# Patient Record
Sex: FEMALE
[De-identification: names, ages, dates, MRNs, and addresses within clinical notes are randomized per-mention and may not be internally consistent; named-entity substitution may affect disease eponyms.]

---

## 2022-05-26 ENCOUNTER — DASHBOARD ENCOUNTERS (OUTPATIENT)
Age: 32
End: 2022-05-26

## 2022-05-31 ENCOUNTER — OFFICE VISIT (OUTPATIENT)
Dept: URBAN - METROPOLITAN AREA CLINIC 40 | Facility: CLINIC | Age: 32
End: 2022-05-31

## 2022-05-31 RX ORDER — LISINOPRIL 40 MG/1
1 TABLET TABLET ORAL ONCE A DAY
Status: ACTIVE | COMMUNITY

## 2022-05-31 RX ORDER — DOXAZOSIN MESYLATE 1 MG/1
1 TABLET TABLET ORAL ONCE A DAY
Status: ACTIVE | COMMUNITY

## 2022-05-31 RX ORDER — DICYCLOMINE HYDROCHLORIDE 20 MG/1
1 TABLET TABLET ORAL THREE TIMES A DAY
Status: ACTIVE | COMMUNITY

## 2022-05-31 RX ORDER — PANTOPRAZOLE SODIUM 40 MG/1
1 TABLET TABLET, DELAYED RELEASE ORAL ONCE A DAY
Status: ACTIVE | COMMUNITY

## 2022-05-31 RX ORDER — CHLORTHALIDONE 25 MG/1
1 TABLET IN THE MORNING WITH FOOD TABLET ORAL ONCE A DAY
Status: ACTIVE | COMMUNITY

## 2022-05-31 RX ORDER — SUCRALFATE 1 G/1
1 TABLET ON AN EMPTY STOMACH TABLET ORAL TWICE A DAY
Status: ACTIVE | COMMUNITY

## 2022-05-31 RX ORDER — NIFEDIPINE 60 MG/1
1 TABLET ON AN EMPTY STOMACH TABLET, EXTENDED RELEASE ORAL ONCE A DAY
Status: ACTIVE | COMMUNITY

## 2022-05-31 RX ORDER — MINOXIDIL 2.5 MG/1
1 TABLET TABLET ORAL TWICE A DAY
Status: ACTIVE | COMMUNITY

## 2022-05-31 RX ORDER — CLONIDINE HYDROCHLORIDE 0.3 MG/1
1 TABLET TABLET ORAL ONCE A DAY
Status: ACTIVE | COMMUNITY

## 2022-05-31 RX ORDER — HYDRALAZINE HYDROCHLORIDE 100 MG/1
1 TABLET WITH FOOD TABLET, FILM COATED ORAL TWICE A DAY
Status: ACTIVE | COMMUNITY

## 2022-05-31 RX ORDER — ONDANSETRON 4 MG/1
1 TABLET ON THE TONGUE AND ALLOW TO DISSOLVE TABLET, ORALLY DISINTEGRATING ORAL ONCE A DAY
Status: ACTIVE | COMMUNITY

## 2022-05-31 RX ORDER — LABETALOL HYDROCHLORIDE 200 MG/1
1 TABLET TABLET ORAL TWICE A DAY
Status: ACTIVE | COMMUNITY

## 2022-05-31 NOTE — HPI-TODAY'S VISIT:
Iva Headley is a 32 sima old Black female (two charts in Epic) who was admitted recently with Hypertensive urgency with poorly-controlled HTN. History of GIB. EGD 8/17/21 confirmed gastritis and duodenitis. Pt has known PUD and chronic n/v. She has not been seen by GI as outpt since then. She does not take Protonix daily, she mostly avoid NSAIDs. 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 Valdze, S/P Cholecystectomy in 2017, SBO in 2020, History of abdominal ascites in 09/2017 with therapeutic and diagnostic paracentesis, SBP, Anxiety, Depression, DDD, DJD, and Osteoarthritis.    Last seen by [facility] providers as IP in January of this year with N/V and subsequent EGD w/ bx by Dr. Bauer confirming candida esophagitis. Gastritis, no Hpylori. Patient has not been seen in office for follow up with Dr. Peguero but admits to completing Diflucan and continues pantoprazole , sucralfate and Bentyl at home. This seems to help until she gets a "flare" of undetermined etiology. Admits she has to take the Bentyl 20 minutes before eating. Currently, resting in bed, no N/V, tolerating clears. Less abdominal pain at this time. Loose, nonbloody BM in the last 24 hours. No change at home. States she missed HD recently as she was not feeling well. LMP last week, normal for patient. No other complaints.